# Patient Record
Sex: MALE | Race: WHITE | NOT HISPANIC OR LATINO | Employment: FULL TIME | URBAN - METROPOLITAN AREA
[De-identification: names, ages, dates, MRNs, and addresses within clinical notes are randomized per-mention and may not be internally consistent; named-entity substitution may affect disease eponyms.]

---

## 2022-04-07 ENCOUNTER — HISTORICAL (OUTPATIENT)
Dept: ADMINISTRATIVE | Facility: HOSPITAL | Age: 47
End: 2022-04-07

## 2022-04-23 VITALS
SYSTOLIC BLOOD PRESSURE: 128 MMHG | HEIGHT: 75 IN | DIASTOLIC BLOOD PRESSURE: 83 MMHG | BODY MASS INDEX: 33.72 KG/M2 | OXYGEN SATURATION: 97 % | WEIGHT: 271.19 LBS

## 2022-06-27 ENCOUNTER — OFFICE VISIT (OUTPATIENT)
Dept: URGENT CARE | Facility: CLINIC | Age: 47
End: 2022-06-27
Payer: COMMERCIAL

## 2022-06-27 VITALS
DIASTOLIC BLOOD PRESSURE: 84 MMHG | BODY MASS INDEX: 32.7 KG/M2 | WEIGHT: 263 LBS | HEIGHT: 75 IN | OXYGEN SATURATION: 97 % | TEMPERATURE: 99 F | RESPIRATION RATE: 18 BRPM | SYSTOLIC BLOOD PRESSURE: 126 MMHG | HEART RATE: 103 BPM

## 2022-06-27 DIAGNOSIS — M54.42 ACUTE MIDLINE LOW BACK PAIN WITH LEFT-SIDED SCIATICA: Primary | ICD-10-CM

## 2022-06-27 PROCEDURE — 3074F PR MOST RECENT SYSTOLIC BLOOD PRESSURE < 130 MM HG: ICD-10-PCS | Mod: CPTII,,, | Performed by: FAMILY MEDICINE

## 2022-06-27 PROCEDURE — 3079F DIAST BP 80-89 MM HG: CPT | Mod: CPTII,,, | Performed by: FAMILY MEDICINE

## 2022-06-27 PROCEDURE — 3008F PR BODY MASS INDEX (BMI) DOCUMENTED: ICD-10-PCS | Mod: CPTII,,, | Performed by: FAMILY MEDICINE

## 2022-06-27 PROCEDURE — 96372 THER/PROPH/DIAG INJ SC/IM: CPT | Mod: ,,, | Performed by: FAMILY MEDICINE

## 2022-06-27 PROCEDURE — 1159F PR MEDICATION LIST DOCUMENTED IN MEDICAL RECORD: ICD-10-PCS | Mod: CPTII,,, | Performed by: FAMILY MEDICINE

## 2022-06-27 PROCEDURE — 1160F PR REVIEW ALL MEDS BY PRESCRIBER/CLIN PHARMACIST DOCUMENTED: ICD-10-PCS | Mod: CPTII,,, | Performed by: FAMILY MEDICINE

## 2022-06-27 PROCEDURE — 99213 PR OFFICE/OUTPT VISIT, EST, LEVL III, 20-29 MIN: ICD-10-PCS | Mod: 25,,, | Performed by: FAMILY MEDICINE

## 2022-06-27 PROCEDURE — 1160F RVW MEDS BY RX/DR IN RCRD: CPT | Mod: CPTII,,, | Performed by: FAMILY MEDICINE

## 2022-06-27 PROCEDURE — 3074F SYST BP LT 130 MM HG: CPT | Mod: CPTII,,, | Performed by: FAMILY MEDICINE

## 2022-06-27 PROCEDURE — 96372 PR INJECTION,THERAP/PROPH/DIAG2ST, IM OR SUBCUT: ICD-10-PCS | Mod: ,,, | Performed by: FAMILY MEDICINE

## 2022-06-27 PROCEDURE — 1159F MED LIST DOCD IN RCRD: CPT | Mod: CPTII,,, | Performed by: FAMILY MEDICINE

## 2022-06-27 PROCEDURE — 4010F PR ACE/ARB THEARPY RXD/TAKEN: ICD-10-PCS | Mod: CPTII,,, | Performed by: FAMILY MEDICINE

## 2022-06-27 PROCEDURE — 4010F ACE/ARB THERAPY RXD/TAKEN: CPT | Mod: CPTII,,, | Performed by: FAMILY MEDICINE

## 2022-06-27 PROCEDURE — 99213 OFFICE O/P EST LOW 20 MIN: CPT | Mod: 25,,, | Performed by: FAMILY MEDICINE

## 2022-06-27 PROCEDURE — 3008F BODY MASS INDEX DOCD: CPT | Mod: CPTII,,, | Performed by: FAMILY MEDICINE

## 2022-06-27 PROCEDURE — 3079F PR MOST RECENT DIASTOLIC BLOOD PRESSURE 80-89 MM HG: ICD-10-PCS | Mod: CPTII,,, | Performed by: FAMILY MEDICINE

## 2022-06-27 RX ORDER — LISINOPRIL AND HYDROCHLOROTHIAZIDE 12.5; 2 MG/1; MG/1
1 TABLET ORAL DAILY
COMMUNITY
Start: 2022-05-20

## 2022-06-27 RX ORDER — HYDROCODONE BITARTRATE AND ACETAMINOPHEN 5; 325 MG/1; MG/1
1 TABLET ORAL EVERY 6 HOURS PRN
Qty: 16 TABLET | Refills: 0 | Status: SHIPPED | OUTPATIENT
Start: 2022-06-27 | End: 2022-07-01

## 2022-06-27 RX ORDER — EMPAGLIFLOZIN 25 MG/1
25 TABLET, FILM COATED ORAL DAILY
COMMUNITY
Start: 2022-05-20

## 2022-06-27 RX ORDER — DICLOFENAC SODIUM 50 MG/1
50 TABLET, DELAYED RELEASE ORAL 2 TIMES DAILY PRN
Qty: 14 TABLET | Refills: 0 | Status: SHIPPED | OUTPATIENT
Start: 2022-06-27 | End: 2022-07-04

## 2022-06-27 RX ORDER — METFORMIN HYDROCHLORIDE 500 MG/1
500 TABLET ORAL 2 TIMES DAILY
COMMUNITY
Start: 2022-05-20

## 2022-06-27 RX ORDER — ATORVASTATIN CALCIUM 40 MG/1
40 TABLET, FILM COATED ORAL DAILY
COMMUNITY
Start: 2022-05-14

## 2022-06-27 RX ORDER — AMLODIPINE BESYLATE 5 MG/1
5 TABLET ORAL DAILY
COMMUNITY
Start: 2022-05-20

## 2022-06-27 RX ORDER — KETOROLAC TROMETHAMINE 30 MG/ML
30 INJECTION, SOLUTION INTRAMUSCULAR; INTRAVENOUS
Status: COMPLETED | OUTPATIENT
Start: 2022-06-27 | End: 2022-06-27

## 2022-06-27 RX ORDER — CYCLOBENZAPRINE HCL 10 MG
10 TABLET ORAL 3 TIMES DAILY PRN
Qty: 21 TABLET | Refills: 0 | Status: SHIPPED | OUTPATIENT
Start: 2022-06-27 | End: 2022-07-04

## 2022-06-27 RX ADMIN — KETOROLAC TROMETHAMINE 30 MG: 30 INJECTION, SOLUTION INTRAMUSCULAR; INTRAVENOUS at 05:06

## 2022-06-27 NOTE — PATIENT INSTRUCTIONS
Medications sent to pharmacy.  Start the oral anti-inflammatory tomorrow morning.  Muscle relaxer and pain medication may cause drowsiness.  Do not drink alcohol or drive when taking it.  Do not take any Advil Aleve Motrin or ibuprofen with the anti-inflammatory.  Avoid strenuous activities for the next few days.  If symptoms persist or worsen return to clinic or seek medical attention in May

## 2022-06-27 NOTE — PROGRESS NOTES
"Subjective:       Patient ID: Srinivas Negrete is a 47 y.o. male.    Vitals:  height is 6' 3" (1.905 m) and weight is 119.3 kg (263 lb). His oral temperature is 99.2 °F (37.3 °C). His blood pressure is 126/84 and his pulse is 103. His respiration is 18 and oxygen saturation is 97%.     Chief Complaint: Back Pain ( X 5 days ago/Entered by patient)    47-year-old male presents to clinic complaining of a 4-5 day history of low back pain with some radiation into the left buttocks.  States he does have a history of sciatica.  Patient states the for the 5 days leading up to this he has been doing a lot of walking in [place] areas outside of the state.  Other than that he denies any trauma injury or fall.  Denies any weakness numbness or tingling in the legs.  Denies any bladder or bowel dysfunction. Aleve, tylenol and Advil was taken for pain, mild relief temporally.      Back Pain  This is a new problem. Episode onset: x 5 days ago. The problem occurs rarely. The problem has been gradually worsening since onset. The quality of the pain is described as aching. The pain does not radiate. The pain is at a severity of 9/10. The pain is moderate. The pain is worse during the night. The symptoms are aggravated by lying down, sitting, standing and position. Stiffness is present all day. Treatments tried: Aleve, tylenol and advil  The treatment provided mild relief.       Constitution: Negative.   HENT: Negative.    Neck: neck negative.   Cardiovascular: Negative.    Eyes: Negative.    Respiratory: Negative.    Gastrointestinal: Negative.    Genitourinary: Negative.    Musculoskeletal: Positive for back pain.   Skin: Negative.    Allergic/Immunologic: Negative.    Neurological: Negative.    Hematologic/Lymphatic: Negative.        Objective:      Physical Exam   Constitutional: He is oriented to person, place, and time.   HENT:   Head: Normocephalic and atraumatic.   Pulmonary/Chest: Effort normal.   Abdominal: Normal appearance. " "  Musculoskeletal:         General: Tenderness (Over the lumbar spine.  Mild hypertonicty of the paravertebral musculature . 5/5 strength in the bilateral lower extremities .) present.   Neurological: He is alert and oriented to person, place, and time.   Psychiatric: His behavior is normal. Mood, judgment and thought content normal.   Vitals reviewed.         Previous History      Review of patient's allergies indicates:   Allergen Reactions    Penicillins      Other reaction(s): not sure of reaction       Past Medical History:   Diagnosis Date    High blood pressure     High cholesterol     Type 2 diabetes mellitus without complications      Current Outpatient Medications   Medication Instructions    amLODIPine (NORVASC) 5 mg, Oral, Daily    atorvastatin (LIPITOR) 40 mg, Oral, Daily    cyclobenzaprine (FLEXERIL) 10 mg, Oral, 3 times daily PRN    diclofenac (VOLTAREN) 50 mg, Oral, 2 times daily PRN    HYDROcodone-acetaminophen (NORCO) 5-325 mg per tablet 1 tablet, Oral, Every 6 hours PRN    JARDIANCE 25 mg, Oral, Daily    lisinopriL-hydrochlorothiazide (PRINZIDE,ZESTORETIC) 20-12.5 mg per tablet 1 tablet, Oral, Daily    metFORMIN (GLUCOPHAGE) 500 mg, Oral, 2 times daily     Past Surgical History:   Procedure Laterality Date    orthoscopic Left     left knee     History reviewed. No pertinent family history.    Social History     Tobacco Use    Smoking status: Current Every Day Smoker     Types: Vaping with nicotine    Smokeless tobacco: Never Used   Substance Use Topics    Alcohol use: Not Currently        Physical Exam      Vital Signs Reviewed   /84 (BP Location: Left arm, Patient Position: Sitting)   Pulse 103   Temp 99.2 °F (37.3 °C) (Oral)   Resp 18   Ht 6' 3" (1.905 m)   Wt 119.3 kg (263 lb)   SpO2 97%   BMI 32.87 kg/m²        Procedures    Procedures     Labs   No results found for this or any previous visit.      Assessment:       1. Acute midline low back pain with left-sided " sciatica          Plan:         Medications sent to pharmacy.  Start the oral anti-inflammatory tomorrow morning.  Muscle relaxer and pain medication may cause drowsiness.  Do not drink alcohol or drive when taking it.  Do not take any Advil Aleve Motrin or ibuprofen with the anti-inflammatory.  Avoid strenuous activities for the next few days.  If symptoms persist or worsen return to clinic or seek medical attention in May    Acute midline low back pain with left-sided sciatica    Other orders  -     ketorolac injection 30 mg  -     diclofenac (VOLTAREN) 50 MG EC tablet; Take 1 tablet (50 mg total) by mouth 2 (two) times daily as needed (pain).  Dispense: 14 tablet; Refill: 0  -     HYDROcodone-acetaminophen (NORCO) 5-325 mg per tablet; Take 1 tablet by mouth every 6 (six) hours as needed for Pain.  Dispense: 16 tablet; Refill: 0  -     cyclobenzaprine (FLEXERIL) 10 MG tablet; Take 1 tablet (10 mg total) by mouth 3 (three) times daily as needed for Muscle spasms.  Dispense: 21 tablet; Refill: 0

## 2023-11-11 ENCOUNTER — OFFICE VISIT (OUTPATIENT)
Dept: URGENT CARE | Facility: CLINIC | Age: 48
End: 2023-11-11
Payer: COMMERCIAL

## 2023-11-11 VITALS
HEIGHT: 75 IN | WEIGHT: 260 LBS | DIASTOLIC BLOOD PRESSURE: 78 MMHG | TEMPERATURE: 98 F | BODY MASS INDEX: 32.33 KG/M2 | OXYGEN SATURATION: 98 % | HEART RATE: 102 BPM | RESPIRATION RATE: 18 BRPM | SYSTOLIC BLOOD PRESSURE: 125 MMHG

## 2023-11-11 DIAGNOSIS — M25.519 SHOULDER PAIN, UNSPECIFIED CHRONICITY, UNSPECIFIED LATERALITY: ICD-10-CM

## 2023-11-11 DIAGNOSIS — M54.2 NECK PAIN: Primary | ICD-10-CM

## 2023-11-11 LAB — GLUCOSE SERPL-MCNC: 181 MG/DL (ref 70–110)

## 2023-11-11 PROCEDURE — 82962 GLUCOSE BLOOD TEST: CPT | Mod: ,,,

## 2023-11-11 PROCEDURE — 82962 POCT GLUCOSE, HAND-HELD DEVICE: ICD-10-PCS | Mod: ,,,

## 2023-11-11 PROCEDURE — 99213 PR OFFICE/OUTPT VISIT, EST, LEVL III, 20-29 MIN: ICD-10-PCS | Mod: 25,,,

## 2023-11-11 PROCEDURE — 99213 OFFICE O/P EST LOW 20 MIN: CPT | Mod: 25,,,

## 2023-11-11 PROCEDURE — 96372 THER/PROPH/DIAG INJ SC/IM: CPT | Mod: ,,,

## 2023-11-11 PROCEDURE — 96372 PR INJECTION,THERAP/PROPH/DIAG2ST, IM OR SUBCUT: ICD-10-PCS | Mod: ,,,

## 2023-11-11 RX ORDER — DICLOFENAC SODIUM 50 MG/1
50 TABLET, DELAYED RELEASE ORAL 2 TIMES DAILY PRN
Qty: 14 TABLET | Refills: 0 | Status: SHIPPED | OUTPATIENT
Start: 2023-11-11 | End: 2023-11-18

## 2023-11-11 RX ORDER — TRAMADOL HYDROCHLORIDE 50 MG/1
50 TABLET ORAL EVERY 6 HOURS PRN
Qty: 12 TABLET | Refills: 0 | Status: SHIPPED | OUTPATIENT
Start: 2023-11-11 | End: 2023-11-14

## 2023-11-11 RX ORDER — KETOROLAC TROMETHAMINE 30 MG/ML
30 INJECTION, SOLUTION INTRAMUSCULAR; INTRAVENOUS
Status: COMPLETED | OUTPATIENT
Start: 2023-11-11 | End: 2023-11-11

## 2023-11-11 RX ORDER — CYCLOBENZAPRINE HCL 10 MG
10 TABLET ORAL 3 TIMES DAILY PRN
Qty: 15 TABLET | Refills: 0 | Status: SHIPPED | OUTPATIENT
Start: 2023-11-11 | End: 2023-11-21

## 2023-11-11 RX ADMIN — KETOROLAC TROMETHAMINE 30 MG: 30 INJECTION, SOLUTION INTRAMUSCULAR; INTRAVENOUS at 02:11

## 2023-11-11 NOTE — PROGRESS NOTES
"Subjective:      Patient ID: Srinivas Negrete is a 48 y.o. male.    Vitals:  height is 6' 3" (1.905 m) and weight is 117.9 kg (260 lb). His oral temperature is 98.2 °F (36.8 °C). His blood pressure is 125/78 and his pulse is 102. His respiration is 18 and oxygen saturation is 98%.     Chief Complaint: Neck Pain     Patient is a 48 y.o. male who presents to urgent care with complaints of bilateral neck and shoulder worsening over the last 1-2 months.  Patient reports pain worse to the left side of the neck/shoulder.  He does report radiation of pain through the upper arm.  Pain is all reproducible with movement and palpation.  Patient denies chest pain, jaw pain, shortness breath,  injury or trauma, fall .  He reports he gets massages monthly and when symptoms initially began massage therapy helped for a few weeks and symptoms returned.  Over-the-counter medications include Advil and Tylenol with mild relief of symptoms.  Patient reports pain worsening especially when lying in bed at night.  Denies taking anything for pain today.  Patient denies numbness, tingling, neck stiffness, rash, focal weakness, bowel or bladder dysfunction.    Neck Pain       Neck: Positive for neck pain.   Musculoskeletal:  Positive for pain and muscle ache. Negative for trauma.      Objective:         Assessment:     1. Neck pain    2. Shoulder pain, unspecified chronicity, unspecified laterality        Plan:       Neck pain  -     ketorolac injection 30 mg  -     cyclobenzaprine (FLEXERIL) 10 MG tablet; Take 1 tablet (10 mg total) by mouth 3 (three) times daily as needed for Muscle spasms.  Dispense: 15 tablet; Refill: 0  -     traMADoL (ULTRAM) 50 mg tablet; Take 1 tablet (50 mg total) by mouth every 6 (six) hours as needed for Pain.  Dispense: 12 tablet; Refill: 0  -     diclofenac (VOLTAREN) 50 MG EC tablet; Take 1 tablet (50 mg total) by mouth 2 (two) times daily as needed (pain).  Dispense: 14 tablet; Refill: 0  -     POCT Glucose, " Hand-Held Device    Shoulder pain, unspecified chronicity, unspecified laterality  -     ketorolac injection 30 mg        CBG in clinic 181, as patient is diabetic and last A1c 7.8 will avoid steroids at this time.  Will treat with anti-inflammatories, muscle relaxers & Toradol injection in clinic.     As symptoms have been present for greater than 1 month discussed following with primary care or possible referral.  Patient reports he is planning on seeing his PCP at the beginning of December and will follow up with him if symptoms fail to improve despite discussed therapy and treatment today.     Do not take anti-inflammatory prescription today as you were given a Toradol injection in clinic, may begin tomorrow.  Do not take other anti-inflammatories such as Aleve, Advil, ibuprofen at home.  Take medications only as prescribed as they may cause sedation (safety precautions given).   Drink plenty of fluids and get plenty of rest.  Take medication with food.   Neck stretches daily.  Avoid strenuous lifting, use proper body mechanics.  Apply heating pad, Ice pack, Biofreeze or Epsom salt baths as needed.    Follow-up with your primary care doctor as needed.   Present to the Emergency Department with any significant change or worsening symptoms.

## 2023-11-11 NOTE — PATIENT INSTRUCTIONS
Do not take anti-inflammatory prescription today as you were given a Toradol injection in clinic, may begin tomorrow.  Do not take other anti-inflammatories such as Aleve, Advil, ibuprofen at home.  Take medications only as prescribed as they may cause sedation (safety precautions given).   Drink plenty of fluids and get plenty of rest.  Take medication with food.   Neck stretches daily.  Avoid strenuous lifting, use proper body mechanics.  Apply heating pad, Ice pack, Biofreeze or Epsom salt baths as needed.    Follow-up with your primary care doctor as needed.   Present to the Emergency Department with any significant change or worsening symptoms.    Smoking cessation strongly encouraged.  Assistance offered, information will be provided.  If not ready to quit now, patient will contact this clinic in the future if I can be of any specific health related to the discontinuation of smoking/tobacco use.

## 2023-11-11 NOTE — PROGRESS NOTES
"Subjective:      Patient ID: Srinivas Negrete is a 48 y.o. male.    Vitals:  height is 6' 3" (1.905 m) and weight is 117.9 kg (260 lb). His oral temperature is 98.2 °F (36.8 °C). His blood pressure is 125/78 and his pulse is 102. His respiration is 18 and oxygen saturation is 98%.     Chief Complaint: Neck Pain     Patient is a 48 y.o. male who presents to urgent care with complaints of bilateral neck and shoulder worsening over the last 1-2 months.  Patient reports pain worse to the left side of the neck/shoulder.  He does report radiation of pain through the upper arm.  Pain is all reproducible with movement and palpation.  Patient denies chest pain, jaw pain, shortness breath,  injury or trauma, fall .  He reports he gets massages monthly and when symptoms initially began massage therapy helped for a few weeks and symptoms returned.  Over-the-counter medications include Advil and Tylenol with mild relief of symptoms.  Patient reports pain worsening especially when lying in bed at night.  Denies taking anything for pain today.  Patient denies numbness, tingling, neck stiffness, rash, focal weakness, bowel or bladder dysfunction.      Musculoskeletal:  Positive for pain and muscle ache. Negative for trauma.      Objective:         Assessment:     1. Neck pain    2. Shoulder pain, unspecified chronicity, unspecified laterality        Plan:       Neck pain  -     ketorolac injection 30 mg  -     cyclobenzaprine (FLEXERIL) 10 MG tablet; Take 1 tablet (10 mg total) by mouth 3 (three) times daily as needed for Muscle spasms.  Dispense: 15 tablet; Refill: 0  -     traMADoL (ULTRAM) 50 mg tablet; Take 1 tablet (50 mg total) by mouth every 6 (six) hours as needed for Pain.  Dispense: 12 tablet; Refill: 0  -     diclofenac (VOLTAREN) 50 MG EC tablet; Take 1 tablet (50 mg total) by mouth 2 (two) times daily as needed (pain).  Dispense: 14 tablet; Refill: 0  -     POCT Glucose, Hand-Held Device    Shoulder pain, unspecified " chronicity, unspecified laterality  -     ketorolac injection 30 mg        CBG in clinic 181, as patient is diabetic and last A1c 7.8 will avoid steroids at this time.  Will treat with anti-inflammatories, muscle relaxers & Toradol injection in clinic.     As symptoms have been present for greater than 1 month discussed following with primary care or possible referral.  Patient reports he is planning on seeing his PCP at the beginning of December and will follow up with him if symptoms fail to improve despite discussed therapy and treatment today.     Do not take anti-inflammatory prescription today as you were given a Toradol injection in clinic, may begin tomorrow.  Do not take other anti-inflammatories such as Aleve, Advil, ibuprofen at home.  Take medications only as prescribed as they may cause sedation (safety precautions given).   Drink plenty of fluids and get plenty of rest.  Take medication with food.   Neck stretches daily.  Avoid strenuous lifting, use proper body mechanics.  Apply heating pad, Ice pack, Biofreeze or Epsom salt baths as needed.    Follow-up with your primary care doctor as needed.   Present to the Emergency Department with any significant change or worsening symptoms.

## 2023-11-11 NOTE — PROGRESS NOTES
"Subjective:      Patient ID: Srinivas Negrete is a 48 y.o. male.    Vitals:  height is 6' 3" (1.905 m) and weight is 117.9 kg (260 lb). His oral temperature is 98.2 °F (36.8 °C). His blood pressure is 125/78 and his pulse is 102. His respiration is 18 and oxygen saturation is 98%.     Chief Complaint: Neck Pain    Patient is a 48 y.o. male who presents to urgent care with complaints of bilateral neck and shoulder worsening over the last 1-2 months.  Patient reports pain worse to the left side of the neck/shoulder.  He does report radiation of pain through the upper arm.  Pain is all reproducible with movement and palpation.  Patient denies chest pain, jaw pain, shortness breath,  injury or trauma, fall .  He reports he gets massages monthly and when symptoms initially began massage therapy helped for a few weeks and symptoms returned.  Over-the-counter medications include Advil and Tylenol with mild relief of symptoms.  Patient reports pain worsening especially when lying in bed at night.  Denies taking anything for pain today.  Patient denies numbness, tingling, neck stiffness, rash, focal weakness, bowel or bladder dysfunction.    Neck Pain         Neck: Positive for neck pain.   Musculoskeletal:  Positive for pain and muscle ache. Negative for trauma.      Objective:     Physical Exam   Constitutional: He is oriented to person, place, and time. He appears well-developed. He is cooperative. No distress.   HENT:   Head: Normocephalic and atraumatic.   Nose: Nose normal.   Mouth/Throat: Oropharynx is clear and moist and mucous membranes are normal.   Eyes: Conjunctivae and lids are normal.   Neck: Trachea normal and phonation normal. Neck supple. No neck rigidity present. No decreased range of motion present. No pain with movement present.   Cardiovascular: Normal rate, regular rhythm, normal heart sounds and normal pulses.   Pulmonary/Chest: Effort normal and breath sounds normal.   Abdominal: Normal appearance. "   Musculoskeletal:         General: Tenderness present. No deformity or signs of injury.      Right shoulder: He exhibits decreased range of motion. He exhibits no bony tenderness.      Left shoulder: He exhibits tenderness. He exhibits normal range of motion.      Cervical back: He exhibits tenderness. He exhibits no bony tenderness.      Thoracic back: He exhibits no bony tenderness.      Lumbar back: He exhibits no bony tenderness.        Back:       Comments: TTP to the paraspinous muscles cervical region primarily left-sided with TTP noted to the left trapezius musculature, pain with range of motion of the arm and shoulder primarily to the trapezius and base of the left cervical region,   Neurological: He is alert and oriented to person, place, and time. He has normal strength and normal reflexes. No sensory deficit.   Skin: Skin is warm, dry, intact and not diaphoretic.   Psychiatric: His speech is normal and behavior is normal. Judgment and thought content normal.   Nursing note and vitals reviewed.      Assessment:     1. Neck pain    2. Shoulder pain, unspecified chronicity, unspecified laterality        Plan:       Neck pain  -     ketorolac injection 30 mg  -     cyclobenzaprine (FLEXERIL) 10 MG tablet; Take 1 tablet (10 mg total) by mouth 3 (three) times daily as needed for Muscle spasms.  Dispense: 15 tablet; Refill: 0  -     traMADoL (ULTRAM) 50 mg tablet; Take 1 tablet (50 mg total) by mouth every 6 (six) hours as needed for Pain.  Dispense: 12 tablet; Refill: 0  -     diclofenac (VOLTAREN) 50 MG EC tablet; Take 1 tablet (50 mg total) by mouth 2 (two) times daily as needed (pain).  Dispense: 14 tablet; Refill: 0  -     POCT Glucose, Hand-Held Device    Shoulder pain, unspecified chronicity, unspecified laterality  -     ketorolac injection 30 mg           CBG in clinic 181, as patient is diabetic and last A1c 7.8 will avoid steroids at this time.  Will treat with anti-inflammatories, muscle relaxers &  Toradol injection in clinic.     As symptoms have been present for greater than 1 month discussed following with primary care or possible referral.  Patient reports he is planning on seeing his PCP at the beginning of December and will follow up with him if symptoms fail to improve despite discussed therapy and treatment today.     Do not take anti-inflammatory prescription today as you were given a Toradol injection in clinic, may begin tomorrow.  Do not take other anti-inflammatories such as Aleve, Advil, ibuprofen at home.  Take medications only as prescribed as they may cause sedation (safety precautions given).   Drink plenty of fluids and get plenty of rest.  Take medication with food.   Neck stretches daily.  Avoid strenuous lifting, use proper body mechanics.  Apply heating pad, Ice pack, Biofreeze or Epsom salt baths as needed.    Follow-up with your primary care doctor as needed.   Present to the Emergency Department with any significant change or worsening symptoms.

## 2024-06-19 ENCOUNTER — OFFICE VISIT (OUTPATIENT)
Dept: ORTHOPEDICS | Facility: CLINIC | Age: 49
End: 2024-06-19
Payer: COMMERCIAL

## 2024-06-19 VITALS — BODY MASS INDEX: 32.2 KG/M2 | HEIGHT: 75 IN | WEIGHT: 259 LBS

## 2024-06-19 DIAGNOSIS — M54.12 CERVICAL RADICULOPATHY: ICD-10-CM

## 2024-06-19 DIAGNOSIS — M75.22 BICIPITAL TENDONITIS OF LEFT SHOULDER: ICD-10-CM

## 2024-06-19 DIAGNOSIS — M25.511 BILATERAL SHOULDER PAIN, UNSPECIFIED CHRONICITY: Primary | ICD-10-CM

## 2024-06-19 DIAGNOSIS — M75.21 BICIPITAL TENDONITIS OF RIGHT SHOULDER: ICD-10-CM

## 2024-06-19 DIAGNOSIS — M25.512 BILATERAL SHOULDER PAIN, UNSPECIFIED CHRONICITY: Primary | ICD-10-CM

## 2024-06-19 DIAGNOSIS — S46.111A LABRAL TEAR OF LONG HEAD OF RIGHT BICEPS TENDON, INITIAL ENCOUNTER: ICD-10-CM

## 2024-06-19 DIAGNOSIS — M75.101 TEAR OF RIGHT ROTATOR CUFF, UNSPECIFIED TEAR EXTENT, UNSPECIFIED WHETHER TRAUMATIC: ICD-10-CM

## 2024-06-19 DIAGNOSIS — M54.2 NECK PAIN: ICD-10-CM

## 2024-06-19 RX ORDER — METHYLPREDNISOLONE 4 MG/1
TABLET ORAL
Qty: 1 EACH | Refills: 0 | Status: SHIPPED | OUTPATIENT
Start: 2024-06-19

## 2024-06-20 NOTE — PROGRESS NOTES
"Subjective:    CC: Pain of the Left Shoulder and Pain of the Right Shoulder (ELOINA shoulder pain. Been hurting for at least a year. Pain radiates down humerus. Able to lift arms up but has pain when he moves arms out or backwards. Sharp pain. Has numbness and tingling when moving them the wrong direction. Loses feeling in arms when sleeping. )       HPI:  Patient comes in today for his 1st visit.  Patient complains of bilateral shoulder pain.  Patient states he is also having some numbness going down both shoulders into the mid arm region.  He denies any specific neck pain.  His main complaint is when he moves his shoulder in certain positions, he has a sharp stabbing pain.  He also complains of loss of motion.  ROS: Refer to HPI for pertinent ROS. All other 12 point systems negative.    Objective:  Vitals:    06/19/24 1409   Weight: 117.5 kg (259 lb)   Height: 6' 3" (1.905 m)        Physical Exam:  Well-nourished developed male he is awake alert and oriented x3 he has in no apparent stress is pleasant and cooperative.  Examination of the bilateral upper extremities compartment soft and warm.  Skin is intact.  There is no signs symptoms of DVT or infection.  He is point tender along with the proximal biceps tendon in the left and right shoulder.  Positive Morgan's in both shoulders.  He is able to forward flex and abduct to 140° positive Brumfield positive empty can sign negative drop-arm stable to stressing, neurovascular intact distally.  Examination of the cervical spine he has nontender along the paraspinal muscles, he has appropriate with flexion-extension and rotation.  He has 5/5 strength from C5 through T1 sensation intact to light touch negative Yair's bilaterally.    Images:  X-rays three views left shoulder and right shoulder demonstrate no obvious fracture or dislocation, three views of the cervical spine demonstrate some degenerative changes. Images Reviewed and discussed with " patient.    Assessment:  1. Bilateral shoulder pain, unspecified chronicity  - X-ray Shoulder 2 or More Views Right; Future  - X-Ray Shoulder 2 or More Views Left; Future    2. Neck pain  - X-Ray Cervical Spine 2 or 3 Views; Future    3. Bicipital tendonitis of left shoulder    4. Bicipital tendonitis of right shoulder  - MRI Shoulder Without Contrast Right    5. Labral tear of long head of right biceps tendon, initial encounter  - MRI Shoulder Without Contrast Right    6. Tear of right rotator cuff, unspecified tear extent, unspecified whether traumatic  - MRI Shoulder Without Contrast Right    7. Cervical radiculopathy        Plan:  At this time we discussed his physical exam and x-ray findings.  In regards to his left and right shoulder, patient remains be symptomatic about both biceps tendons, worse on the right, we will proceed with an MRI as he has failed multiple conservative treatments.  In regards to his cervical spine, we have discussed some anti-inflammatories with appropriate precautions.    Follow UP: No follow-ups on file.

## 2024-06-26 ENCOUNTER — OFFICE VISIT (OUTPATIENT)
Dept: ORTHOPEDICS | Facility: CLINIC | Age: 49
End: 2024-06-26
Payer: COMMERCIAL

## 2024-06-26 DIAGNOSIS — M75.81 RIGHT ROTATOR CUFF TENDONITIS: Primary | ICD-10-CM

## 2024-06-26 DIAGNOSIS — M75.41 SHOULDER IMPINGEMENT SYNDROME, RIGHT: ICD-10-CM

## 2024-06-26 PROCEDURE — 99213 OFFICE O/P EST LOW 20 MIN: CPT | Mod: ,,, | Performed by: ORTHOPAEDIC SURGERY

## 2024-06-26 PROCEDURE — 4010F ACE/ARB THERAPY RXD/TAKEN: CPT | Mod: CPTII,,, | Performed by: ORTHOPAEDIC SURGERY

## 2024-06-26 PROCEDURE — 1160F RVW MEDS BY RX/DR IN RCRD: CPT | Mod: CPTII,,, | Performed by: ORTHOPAEDIC SURGERY

## 2024-06-26 PROCEDURE — 1159F MED LIST DOCD IN RCRD: CPT | Mod: CPTII,,, | Performed by: ORTHOPAEDIC SURGERY

## 2024-06-27 NOTE — PROGRESS NOTES
Subjective:    CC: Results of the Right Shoulder (MRI results)       HPI:  Patient returns today for repeat exam.  Patient had an MRI of his right shoulder.  We have discussed his results.  Patient states overall his shoulder is improving.  He states the Medrol Dosepak has helped.    ROS: Refer to [facility] for pertinent ROS. All other 12 point systems negative.    Objective:  There were no vitals filed for this visit.     Physical Exam:  Patient is well-nourished and well-developed, in no apparent distress, pleasant and cooperative. Examination of the right upper extremity compartments are soft and warm.  Skin is intact. There are no signs or symptoms of DVT or infection.   Patient is tender to palpation along the  posterolateral aspect .  Patient is able to forward flex and abduct to 150.  Positive Brumfield and Neers, positive empty can, negative drop arm test. Negative sulcus sign. Stable to stressing. Neurovascularly intact distally.    Images:  MRI reviewed. Images Reviewed and discussed with patient.    Assessment:  1. Right rotator cuff tendonitis    2. Shoulder impingement syndrome, right        Plan:  At this time we discussed his physical exam and MRI findings.  We will continue conservative treatment we have discussed some shoulder range of motion strengthening exercises, home therapy versus formal therapy.  We have also discussed possible contribution from his cervical spine, referred pain.  He would like to hold off on this at this time.  I would like see him back with any problems or difficulties.    Follow UP: No follow-ups on file.

## 2024-12-24 ENCOUNTER — OFFICE VISIT (OUTPATIENT)
Dept: URGENT CARE | Facility: CLINIC | Age: 49
End: 2024-12-24
Payer: COMMERCIAL

## 2024-12-24 VITALS
SYSTOLIC BLOOD PRESSURE: 129 MMHG | OXYGEN SATURATION: 97 % | TEMPERATURE: 98 F | HEIGHT: 75 IN | BODY MASS INDEX: 32.95 KG/M2 | HEART RATE: 103 BPM | DIASTOLIC BLOOD PRESSURE: 82 MMHG | WEIGHT: 265 LBS

## 2024-12-24 DIAGNOSIS — S16.1XXA STRAIN OF NECK MUSCLE, INITIAL ENCOUNTER: Primary | ICD-10-CM

## 2024-12-24 PROCEDURE — 96372 THER/PROPH/DIAG INJ SC/IM: CPT | Mod: ,,, | Performed by: FAMILY MEDICINE

## 2024-12-24 PROCEDURE — 99213 OFFICE O/P EST LOW 20 MIN: CPT | Mod: 25,,, | Performed by: FAMILY MEDICINE

## 2024-12-24 RX ORDER — TRAMADOL HYDROCHLORIDE 50 MG/1
50 TABLET ORAL EVERY 6 HOURS PRN
Qty: 12 TABLET | Refills: 0 | Status: SHIPPED | OUTPATIENT
Start: 2024-12-24 | End: 2024-12-27

## 2024-12-24 RX ORDER — KETOROLAC TROMETHAMINE 30 MG/ML
30 INJECTION, SOLUTION INTRAMUSCULAR; INTRAVENOUS
Status: COMPLETED | OUTPATIENT
Start: 2024-12-24 | End: 2024-12-24

## 2024-12-24 RX ORDER — CYCLOBENZAPRINE HCL 10 MG
10 TABLET ORAL 3 TIMES DAILY PRN
Qty: 21 TABLET | Refills: 0 | Status: SHIPPED | OUTPATIENT
Start: 2024-12-24 | End: 2024-12-31

## 2024-12-24 RX ORDER — DICLOFENAC SODIUM 50 MG/1
50 TABLET, DELAYED RELEASE ORAL 2 TIMES DAILY PRN
Qty: 14 TABLET | Refills: 0 | Status: SHIPPED | OUTPATIENT
Start: 2024-12-24 | End: 2024-12-31

## 2024-12-24 RX ADMIN — KETOROLAC TROMETHAMINE 30 MG: 30 INJECTION, SOLUTION INTRAMUSCULAR; INTRAVENOUS at 01:12

## 2024-12-24 NOTE — PROGRESS NOTES
"Subjective:      Patient ID: Srinivas Negrete is a 49 y.o. male.    Vitals:  height is 6' 3" (1.905 m) and weight is 120.2 kg (265 lb). His temperature is 97.8 °F (36.6 °C). His blood pressure is 129/82 and his pulse is 103. His oxygen saturation is 97%.     Chief Complaint: Other Misc (Neck pain - Entered by patient) and Neck Pain     Patient is a 49 y.o. male who presents to urgent care with complaints of neck and right shoulder is hurting from moving boxes x1 days. Alleviating factors include tylenol with no relief. Patient denies N/V/D HA BA.  Denies any weakness or numbness in the arm.  States when he does get shooting pain it will radiate into the arm.  Did not take any NSAIDs today        Constitution: Negative.   HENT: Negative.     Neck: neck negative.   Cardiovascular: Negative.    Eyes: Negative.    Respiratory: Negative.     Gastrointestinal: Negative.    Genitourinary: Negative.    Musculoskeletal: Negative.  Positive for pain.   Skin: Negative.    Allergic/Immunologic: Negative.    Neurological: Negative.    Hematologic/Lymphatic: Negative.       Objective:     Physical Exam   Constitutional: He is oriented to person, place, and time.  Non-toxic appearance. He does not appear ill. No distress.   HENT:   Head: Normocephalic and atraumatic.   Pulmonary/Chest: Effort normal. No respiratory distress.   Abdominal: Normal appearance.   Musculoskeletal:         General: Tenderness (tenderness to palpation right posterior neck area.  Full range of motion of the right arm) present. No deformity.      Cervical back: He exhibits tenderness.   Neurological: He is alert and oriented to person, place, and time.   Skin: Skin is not diaphoretic.   Psychiatric: His behavior is normal. Mood, judgment and thought content normal.   Vitals reviewed.       Previous History      Review of patient's allergies indicates:   Allergen Reactions    Penicillins      Other reaction(s): not sure of reaction       Past Medical History: " "  Diagnosis Date    High blood pressure     High cholesterol     Type 2 diabetes mellitus without complications      Current Outpatient Medications   Medication Instructions    amLODIPine (NORVASC) 5 mg, Daily    atorvastatin (LIPITOR) 40 mg, Daily    cyclobenzaprine (FLEXERIL) 10 mg, Oral, 3 times daily PRN    diclofenac (VOLTAREN) 50 mg, Oral, 2 times daily PRN    JARDIANCE 25 mg, Daily    lisinopriL-hydrochlorothiazide (PRINZIDE,ZESTORETIC) 20-12.5 mg per tablet 1 tablet, Daily    metFORMIN (GLUCOPHAGE) 500 mg, 2 times daily    methylPREDNISolone (MEDROL DOSEPACK) 4 mg tablet use as directed    traMADoL (ULTRAM) 50 mg, Oral, Every 6 hours PRN     Past Surgical History:   Procedure Laterality Date    orthoscopic Left     left knee     Family History   Problem Relation Name Age of Onset    Diabetes Mother Joanne Negrete     Diabetes Father El Negrete     Hypertension Father El Negrete     Learning disabilities Brother Oneil Negrete        Social History     Tobacco Use    Smoking status: Every Day     Current packs/day: 0.25     Average packs/day: 0.3 packs/day for 5.0 years (1.3 ttl pk-yrs)     Types: Vaping with nicotine, Cigarettes    Smokeless tobacco: Never   Substance Use Topics    Alcohol use: Not Currently    Drug use: Never        Physical Exam      Vital Signs Reviewed   /82   Pulse 103   Temp 97.8 °F (36.6 °C)   Ht 6' 3" (1.905 m)   Wt 120.2 kg (265 lb)   SpO2 97%   BMI 33.12 kg/m²        Procedures    Procedures     Labs     Results for orders placed or performed in visit on 11/11/23   POCT Glucose, Hand-Held Device    Collection Time: 11/11/23  2:15 PM   Result Value Ref Range    POC Glucose 181 (A) 70 - 110 MG/DL         Assessment:     1. Strain of neck muscle, initial encounter        Plan:   Medications sent to pharmacy  Start the oral anti-inflammatory diclofenac tomorrow morning.  Take it with food.  Do not take any Advil Alleve Motrin ibuprofen naproxen with it  Muscle relaxer " and pain medication may cause drowsiness.  Do not drink alcohol or drive when taking it.  You can start them today.  First 72 hours apply cold compress to the affected area through 4 times a day.  For 10-15 minutes.  After 72 hours you can apply heat or cold compress whichever feels better  No strenuous activities or heavy lifting for the next several days.  Contact this clinic with any concerns    Strain of neck muscle, initial encounter    Other orders  -     ketorolac injection 30 mg  -     diclofenac (VOLTAREN) 50 MG EC tablet; Take 1 tablet (50 mg total) by mouth 2 (two) times daily as needed (pain).  Dispense: 14 tablet; Refill: 0  -     cyclobenzaprine (FLEXERIL) 10 MG tablet; Take 1 tablet (10 mg total) by mouth 3 (three) times daily as needed for Muscle spasms.  Dispense: 21 tablet; Refill: 0  -     traMADoL (ULTRAM) 50 mg tablet; Take 1 tablet (50 mg total) by mouth every 6 (six) hours as needed for Pain.  Dispense: 12 tablet; Refill: 0

## 2024-12-24 NOTE — PATIENT INSTRUCTIONS
Plan:   Medications sent to pharmacy  Start the oral anti-inflammatory diclofenac tomorrow morning.  Take it with food.  Do not take any Advil Alleve Motrin ibuprofen naproxen with it  Muscle relaxer and pain medication may cause drowsiness.  Do not drink alcohol or drive when taking it.  You can start them today.  First 72 hours apply cold compress to the affected area through 4 times a day.  For 10-15 minutes.  After 72 hours you can apply heat or cold compress whichever feels better  No strenuous activities or heavy lifting for the next several days.  Contact this clinic with any concerns